# Patient Record
Sex: FEMALE | Race: WHITE
[De-identification: names, ages, dates, MRNs, and addresses within clinical notes are randomized per-mention and may not be internally consistent; named-entity substitution may affect disease eponyms.]

---

## 2020-05-07 NOTE — REP
Clinical:  Anatomical evaluation.

 

Comparison: none

 

Findings:

Examination demonstrates a single live intrauterine pregnancy in variable

presentation.  Fetal motion is identified by technologist.  Placenta is noted

anterior and grade I without evidence for placenta previa or abruption.  Amniotic

fluid volume is normal.  Cervix measures 3.6 cm in length and appears closed.  No

evidence for nuchal cord.

 

Gestational age by LMP 20 weeks 0 days with TATE 09/23/2020 .

Gestational age by current measurements 19 weeks 5 days with TATE 09/25/2020 .

 

FHR equals 152 beats per minute.

BPD  4.6 cm     19 weeks 6 days

HC  16.9 cm     19 weeks 4 days

AC  15.0 cm     20 weeks 2 days

FL  3.2 cm      19 weeks 6 days

HL  3.1 cm      20 weeks 1 day

HC/AC ratio  1.12

 

Estimated fetal weight 329 grams ( 49th percentile).

 

Anatomical assessment demonstrates normal structures including cranium, choroid

plexus, cavum, cerebellum/posterior fossa, facial features, lungs, four-chamber

heart/ventricular outflow tracts, diaphragm, stomach, cord insertion/three-vessel

cord, kidneys/bladder, spine, and extremities.

 

Impression:

Single live intrauterine pregnancy in variable presentation demonstrating

appropriate interval growth.  Anatomical assessment is complete and normal.  No

gross abnormalities are identified.

## 2020-09-12 NOTE — HPEPDOC
Obstetrical History & Physical


General


Date of Admission


Sep 12, 2020 at 10:39





History of Present Illness


35-year-old  2, para 1 who presents at 38 weeks 3 days estimated 

gestational age in active labor. She reports regular contractions started 

approximately 6:30 this morning that of increasing intensity and frequency. She 

denies any vaginal bleeding, leakage fluid or vaginal bleeding.


Chief Complaint:  Contractions, term


Information Provided By:  Patient


Age:  35


:  2


Livin





Prenatal Care


Prenatal Care:  Good Care





Prenatal Dating


Final EDC:  Sep 23, 2020


Final EDC by:  1st trimester (US)


EGA at Admission:  38





Past Medical History


Past Obstetrical History :  


   Past Obstetrical History:  Multigravida


   Type of Delivery:  Ceserean section


   Sex of Infant:  Female


GYN History:  No pertinent history


Past Medical History


Surgical History:   section





Family History


Significant Family History:  No pertinent family hx





Social History


Marital Status:  


Family situation:  Spouse/partner home


Psychosocial History:  No pertinent psych hx


* Smoker:  non-smoker


Alcohol:  Denies





Allergies


Coded Allergies:  


     lidocaine (Verified  Allergy, Unknown, hives, 20)


     erythromycin base (Verified  Adverse Reaction, Mild, nausea, 20)





Medications


Scheduled


Cholecalciferol (Vitamin D3) (Vitamin D3) 1,000 Unit Tablet, 1,000 UNITS PO 

DAILY


Famotidine (Famotidine) 20 Mg Tablet, 20 MG PO DAILY


Magnesium Amino Acid Chelate (Magnesium) 100 Mg Tablet, 100 MG PO DAILY


Pnv No.95/Ferrous Fum/Folic AC (Prenatal Formula Tablet) 1 Tab Tab, 1 TAB PO 

DAILY





Physical Examination


Physical Examination


GENERAL: Alert and oriented times three.


BREAST: .


ABDOMEN: Gravid and non-tender to touch.


FETUS: Is vertex (VTX) by sterile vaginal examination (SVE), fetus is vertex 

(VTX) by Leopold.


HEART RATE: Regular rate and rhythm.


LUNGS: Clear to auscultation (CTA).





Laboratory Data


24H LABS


Laboratory Tests 2


20 10:55: Serology Scanned Report Hepatitis B Testing


20 10:57: Nucleated Red Blood Cells % (auto) 0.0


CBC/BMP


Laboratory Tests


20 10:57











Pertinent Laboratoy Data


Blood Type:  A+


HIV:  Negative


Hepatitis B:  Negative


Hepatitis C:  Negative


Rapid Plasma Reagin:  Nonreactive


Rubella:  Immune


Chlamydia/Gonorrhea:  Negative


Group B Streptococcus:  Negative





Vaginal Examination


Fetal Presentation:  Cephalic presentation





Fetal Assessment


Variability:  Moderate


Accelerations:  Positive





Tocometer


Contractions:  Yes


Frequency:  regular





Assessment/Plan


Assessment


35-year-old  2, para 1 at 38 weeks 2 days estimated gestational age in 

active labor.


History of prior  section.


Reassuring fetal status.


Patient's been thoroughly counseled in regards to her mode of delivery. I 

discuss vaginal birth at  section versus an elective repeat  

section. Patient has expressed her desire for repeat  section as for 

permanent sterilization. Plan is to proceed with repeat  section with 

bilateral tubal ligation











KELLY MUNOZ MD.                 Sep 12, 2020 11:57

## 2020-09-12 NOTE — ROOPDOC
[facility] Report Of Operation


Report of Operation


DATE OF PROCEDURE: 20





SURGEON: Mikala Wilkinson M.D.





PROCEDURE: Repeat  section with bilateral Gil[place] tubal ligation





PREOPERATIVE DIAGNOSIS: 


1. History of prior  section


2. Intrauterine pregnancy at 38 weeks in active labor


3. Satisfied parity with undesired fertility





POSTOPERATIVE DIAGNOSIS: 


1. History of prior  section


2. Intrauterine pregnancy at 38 weeks in active labor


3. Satisfied parity with undesired fertility





ANESTHESIA: Spinal 





ESTIMATED BLOOD LOSS: 600 mL





URINE OUTPUT: 100 mL





INTRAVENOUS FLUIDS:, 1200 mL of lactated Ringer's solution





PREOPERATIVE ANTIBIOTICS:. 2 g of Ancef





OPERATIVE FINDINGS: Liveborn male infant, Apgars 9 and 9. Weight was 3310 g or 7

lbs. 5 oz.





SPECIMENS: Lateral segments of fallopian tubes





DESCRIPTION OF PROCEDURE: After informed consent was obtained and written 

consent was reviewed. The patient was brought to the operating room where spinal

anesthesia was placed. She was then placed in the supine position with a left 

lateral tilt. Conway catheter was placed and to gravity. Patient was then prepped

and draped in the normal sterile fashion. A timeout operating room was performed

identifying the patient, procedure be performed as well as drug allergies. 

Anesthesia was tested and deemed to be adequate. Pfannenstiel skin incision was 

made and this was carried down to the underlying rectus fascia. The fascia was 

then scored and this incision was extended bilaterally. The fascia was then 

dissected off the underlying rectus muscle superiorly and inferiorly. The rectus

muscles were then  in the midline. The peritoneum is then entered. 

Vesicouterine peritoneum was then tented and excised and a bladder flap was cr

eated. Mobius retractor was then placed. Next, a curvilinear incision was then 

made in the lower uterine segment. Amniotomy was performed, productive, clear 

fluid. The fetal head was brought to the level of the incision atraumatically 

and delivered along the shoulders and corpus. The cord was clamped x2. The 

infant was brought over to the warmer with a good cry. Placenta was drained and 

delivered grossly intact. The uterus was cleared of all clots and debris and the

uterine incision was then closed using 0 Vicryl in a running locking fashion 

followed. Attention was then turned to a bilateral Gilgo tubal ligation.  A 

window was created in the right mesosalpinx.  This area was doubly ligated with 

3-0 chromic and was excised with good hemostasis noted.  In a similar fashion, 

the left fallopian tube was placed on traction.  A window was created in the 

mesosalpinx. This area was doubly ligated with 3-0 chromic and was excised, and 

hemostasis was noted.  The abdomen suctioned.  Surgical sites reinspected and 

noted be hemostatic. The retractor was then removed. The anterior peritoneum was

then reapproximated with 3-0 Vicryl. The rectus muscles were reapproximated 3-0 

Vicryl. The fascia was then closed using 0 Vicryl in a running nonlocking 

fashion. The subcutaneous tissues was then irrigated and suctioned. Subcutaneous

tissue was reapproximated using 3-0 Vicryl. Several subdermal stitch is placed 

using 3-0 Vicryl and the skin was closed with 4-0 Monocryl and subcuticular 

fashion. This incision was then cleaned and dried and was dressed. The patient 

was then taken to recovery in stable condition. All counts were correct. The 

couple has decided to name the  Paresh.











MIKALA WILKINSON MD.                 Sep 12, 2020 14:01

## 2020-09-13 NOTE — IPNPDOC
Postpartum Progress Note


Date of Service:  Sep 13, 2020


Postpartum Day#:  1


Postpartum Progress Note


SUBJECT: Doing well without complaints. Ambulating, voiding and pain is well-

controlled. Reports minimal lochia. 





OBJECTIVE: 


VITAL SIGNS: Within normal limits, afebrile.


Alert and oriented times three.


Abdomen: Fundus firm at U-2. Soft, NTTP.


Incision: dressed


Ext: neg calf tenderness.


ASSESSMENT: Postpartum/postoperative day #1 status post  delivery. 

Recovering in stable condition.





PLAN:


1. Continue routine postpartum/postoperative care


2. Discharge plans for tomorrow





VS, I&O, 24H, Fishbone


Vital Signs/I&O





Vital Signs








  Date Time  Temp Pulse Resp B/P (MAP) Pulse Ox O2 Delivery O2 Flow Rate FiO2


 


20 02:00 97.9 90 18 104/55 (71) 99 Room Air  














I&O- Last 24 Hours up to 6 AM 


 


 20





 06:00


 


Intake Total 3620 ml


 


Output Total 2900 ml


 


Balance 720 ml











Laboratory Data


24H LABS


Laboratory Tests 2


20 10:55: Serology Scanned Report Hepatitis B Testing


20 10:57: Nucleated Red Blood Cells % (auto) 0.0


CBC/BMP


Laboratory Tests


20 10:57

















KELLY MUNOZ MD.                 Sep 13, 2020 06:22

## 2020-09-14 NOTE — DS.PDOC
Discharge Summary


General


Date of Admission


Sep 12, 2020 at 10:39


Date of Discharge


2020


Attending Physician:  KELLY MUNOZ MD.





Discharge Summary


PROCEDURES PERFORMED DURING STAY: 1. Spinal. 2.  section. 3. [place] 

tubal ligation.





ADMITTING DIAGNOSES: 


1. Active labor.





DISCHARGE DIAGNOSES:


1. Repeat  section.





COMPLICATIONS/CHIEF COMPLAINT: Previous  Section, Satisfied Fertility.





HISTORY OF PRESENT ILLNESS: Mrs. Ann is a 35-year-old  2, para 1 that 

presented at 38+ weeks with regular pattern of contractions and active labor. 

Her history significant for previous . She has expressed her desire for

repeat  section as well as sterilization. She underwent uncomplicated 

 section productive of a liveborn male infant. She did well 

postoperatively. By postoperative day #2, had met all discharge criteria, and 

was discharged home in stable condition.





HOSPITAL COURSE: Uncomplicated. 





DISCHARGE MEDICATIONS: Please see below.


 


ALLERGIES: Please see below.





PHYSICAL EXAMINATION ON DISCHARGE:


VITAL SIGNS: Please see below.


GENERAL: Well-appearing, no acute distress


ABDOMINAL EXAMINATION: Soft, appropriately tender. Fundus is firm below 

umbilicus. Incision was dressed.


EXTREMITIES: Negative for calf tenderness


NEUROLOGICAL EXAMINATION: Grossly intact





LABORATORY DATA: Please see below.





ACTIVITY: As tolerated.





DIET: Regular





DISCHARGE PLAN: Home








DISCHARGE INSTRUCTIONS:


1. Remain on pelvic rest for 6 weeks.


2. Follow up for incision check in 2 weeks


3. Reports severe pain, heavy vaginal bleeding, fever, incisional issues.





ITEMS TO FOLLOWUP ON ON OUTPATIENT:


1. None.





DISCHARGE CONDITION: Stable.





Vital Signs/I&Os





Vital Signs








  Date Time  Temp Pulse Resp B/P (MAP) Pulse Ox O2 Delivery O2 Flow Rate FiO2


 


20 18:00 98.2 82 16 106/63 (77) 100 Room Air  











Discharge Medications


Scheduled


Cholecalciferol (Vitamin D3) (Vitamin D3) 1,000 Unit Tablet, 1,000 UNITS PO 

DAILY, (Reported)


Famotidine (Famotidine) 20 Mg Tablet, 20 MG PO DAILY, (Reported)


Magnesium Amino Acid Chelate (Magnesium) 100 Mg Tablet, 100 MG PO DAILY, 

(Reported)


Pnv No.95/Ferrous Fum/Folic AC (Prenatal Formula Tablet) 1 Tab Tab, 1 TAB PO 

DAILY, (Reported)





Scheduled PRN


Ibuprofen (Ibuprofen) 800 Mg Tablet, 800 MG PO Q8HP PRN for PAIN LEVEL 6-10


Oxycodone HCl/Acetaminophen (Oxycodone-Acetaminophen 5-325) 1 Each Tablet, 1-2 

TAB PO Q6HP PRN for PAIN





Allergies


Coded Allergies:  


     lidocaine (Verified  Allergy, Unknown, hives, 20)


     erythromycin base (Verified  Adverse Reaction, Mild, nausea, 20)











KELLY MUNOZ MD.                 Sep 14, 2020 04:08

## 2021-10-29 ENCOUNTER — HOSPITAL ENCOUNTER (OUTPATIENT)
Dept: HOSPITAL 53 - M LABSMTC | Age: 36
End: 2021-10-29
Attending: FAMILY MEDICINE

## 2021-10-29 DIAGNOSIS — Z11.52: Primary | ICD-10-CM

## 2021-10-29 LAB — RSV RNA NPH QL NAA+PROBE: POSITIVE

## 2022-01-27 ENCOUNTER — HOSPITAL ENCOUNTER (OUTPATIENT)
Dept: HOSPITAL 53 - M LABSMTC | Age: 37
End: 2022-01-27
Attending: FAMILY MEDICINE

## 2022-01-27 DIAGNOSIS — Z11.52: Primary | ICD-10-CM

## 2022-05-13 ENCOUNTER — HOSPITAL ENCOUNTER (OUTPATIENT)
Dept: HOSPITAL 53 - M EMP | Age: 37
End: 2022-05-13
Attending: FAMILY MEDICINE

## 2022-05-13 DIAGNOSIS — Z11.52: Primary | ICD-10-CM

## 2022-05-13 LAB — RSV RNA NPH QL NAA+PROBE: NEGATIVE

## 2022-05-27 ENCOUNTER — HOSPITAL ENCOUNTER (OUTPATIENT)
Dept: HOSPITAL 53 - M LABSMTC | Age: 37
End: 2022-05-27
Attending: FAMILY MEDICINE

## 2022-05-27 DIAGNOSIS — Z11.52: Primary | ICD-10-CM

## 2022-05-27 LAB — RSV RNA NPH QL NAA+PROBE: NEGATIVE
